# Patient Record
Sex: FEMALE | Race: WHITE | Employment: UNEMPLOYED | URBAN - METROPOLITAN AREA
[De-identification: names, ages, dates, MRNs, and addresses within clinical notes are randomized per-mention and may not be internally consistent; named-entity substitution may affect disease eponyms.]

---

## 2017-04-05 ENCOUNTER — TELEPHONE (OUTPATIENT)
Dept: PULMONOLOGY | Age: 67
End: 2017-04-05

## 2017-04-28 ENCOUNTER — HOSPITAL ENCOUNTER (OUTPATIENT)
Dept: PULMONOLOGY | Age: 67
Discharge: HOME | End: 2017-04-28
Attending: INTERNAL MEDICINE | Admitting: INTERNAL MEDICINE

## 2017-04-28 VITALS — RESPIRATION RATE: 14 BRPM | OXYGEN SATURATION: 97 %

## 2017-04-28 DIAGNOSIS — J45.909 UNCOMPLICATED ASTHMA: ICD-10-CM

## 2017-04-28 RX ORDER — ALBUTEROL SULFATE 2.5 MG/3ML
2.5 SOLUTION RESPIRATORY (INHALATION) ONCE
Status: COMPLETED | OUTPATIENT
Start: 2017-04-28 | End: 2017-04-28

## 2017-04-28 RX ADMIN — ALBUTEROL SULFATE 2.5 MG: 2.5 SOLUTION RESPIRATORY (INHALATION) at 14:00

## 2017-05-04 ENCOUNTER — OFFICE VISIT (OUTPATIENT)
Dept: PULMONOLOGY | Age: 67
End: 2017-05-04

## 2017-05-04 VITALS
BODY MASS INDEX: 27.13 KG/M2 | HEIGHT: 66 IN | HEART RATE: 65 BPM | DIASTOLIC BLOOD PRESSURE: 70 MMHG | WEIGHT: 168.8 LBS | SYSTOLIC BLOOD PRESSURE: 114 MMHG | TEMPERATURE: 98 F | RESPIRATION RATE: 16 BRPM | OXYGEN SATURATION: 98 %

## 2017-05-04 DIAGNOSIS — J45.20 MILD INTERMITTENT ASTHMA WITHOUT COMPLICATION: Primary | ICD-10-CM

## 2017-05-04 DIAGNOSIS — J30.9 ALLERGIC RHINITIS, UNSPECIFIED ALLERGIC RHINITIS TRIGGER, UNSPECIFIED RHINITIS SEASONALITY: ICD-10-CM

## 2017-05-04 PROCEDURE — G8427 DOCREV CUR MEDS BY ELIG CLIN: HCPCS | Performed by: INTERNAL MEDICINE

## 2017-05-04 PROCEDURE — 1123F ACP DISCUSS/DSCN MKR DOCD: CPT | Performed by: INTERNAL MEDICINE

## 2017-05-04 PROCEDURE — G8420 CALC BMI NORM PARAMETERS: HCPCS | Performed by: INTERNAL MEDICINE

## 2017-05-04 PROCEDURE — 3014F SCREEN MAMMO DOC REV: CPT | Performed by: INTERNAL MEDICINE

## 2017-05-04 PROCEDURE — 1036F TOBACCO NON-USER: CPT | Performed by: INTERNAL MEDICINE

## 2017-05-04 PROCEDURE — 4040F PNEUMOC VAC/ADMIN/RCVD: CPT | Performed by: INTERNAL MEDICINE

## 2017-05-04 PROCEDURE — 3017F COLORECTAL CA SCREEN DOC REV: CPT | Performed by: INTERNAL MEDICINE

## 2017-05-04 PROCEDURE — 99214 OFFICE O/P EST MOD 30 MIN: CPT | Performed by: INTERNAL MEDICINE

## 2017-05-04 PROCEDURE — 1090F PRES/ABSN URINE INCON ASSESS: CPT | Performed by: INTERNAL MEDICINE

## 2017-05-04 PROCEDURE — G8400 PT W/DXA NO RESULTS DOC: HCPCS | Performed by: INTERNAL MEDICINE

## 2017-05-04 RX ORDER — ALBUTEROL SULFATE 90 UG/1
2 AEROSOL, METERED RESPIRATORY (INHALATION) EVERY 6 HOURS PRN
Qty: 1 INHALER | Refills: 11 | Status: SHIPPED | OUTPATIENT
Start: 2017-05-04 | End: 2018-05-07 | Stop reason: SDUPTHER

## 2017-05-04 RX ORDER — IPRATROPIUM BROMIDE 21 UG/1
2 SPRAY, METERED NASAL 3 TIMES DAILY PRN
Qty: 1 BOTTLE | Refills: 11 | Status: SHIPPED | OUTPATIENT
Start: 2017-05-04 | End: 2018-05-07 | Stop reason: SDUPTHER

## 2017-09-18 ENCOUNTER — HOSPITAL ENCOUNTER (OUTPATIENT)
Dept: OTHER | Age: 67
Discharge: HOME | End: 2017-09-18
Attending: INTERNAL MEDICINE | Admitting: INTERNAL MEDICINE

## 2017-09-18 ENCOUNTER — OFFICE VISIT (OUTPATIENT)
Dept: PULMONOLOGY | Age: 67
End: 2017-09-18

## 2017-09-18 ENCOUNTER — TELEPHONE (OUTPATIENT)
Dept: PULMONOLOGY | Age: 67
End: 2017-09-18

## 2017-09-18 VITALS
DIASTOLIC BLOOD PRESSURE: 70 MMHG | RESPIRATION RATE: 18 BRPM | OXYGEN SATURATION: 97 % | TEMPERATURE: 97.6 F | SYSTOLIC BLOOD PRESSURE: 120 MMHG | HEART RATE: 66 BPM | HEIGHT: 66 IN | WEIGHT: 174.2 LBS | BODY MASS INDEX: 28 KG/M2

## 2017-09-18 DIAGNOSIS — J40 TRACHEOBRONCHITIS: ICD-10-CM

## 2017-09-18 DIAGNOSIS — J45.901 ASTHMA WITH ACUTE EXACERBATION, UNSPECIFIED ASTHMA SEVERITY: ICD-10-CM

## 2017-09-18 DIAGNOSIS — J45.901 ASTHMA WITH ACUTE EXACERBATION, UNSPECIFIED ASTHMA SEVERITY: Primary | ICD-10-CM

## 2017-09-18 PROCEDURE — 1123F ACP DISCUSS/DSCN MKR DOCD: CPT | Performed by: INTERNAL MEDICINE

## 2017-09-18 PROCEDURE — 99214 OFFICE O/P EST MOD 30 MIN: CPT | Performed by: INTERNAL MEDICINE

## 2017-09-18 PROCEDURE — 1036F TOBACCO NON-USER: CPT | Performed by: INTERNAL MEDICINE

## 2017-09-18 PROCEDURE — 1090F PRES/ABSN URINE INCON ASSESS: CPT | Performed by: INTERNAL MEDICINE

## 2017-09-18 PROCEDURE — G8400 PT W/DXA NO RESULTS DOC: HCPCS | Performed by: INTERNAL MEDICINE

## 2017-09-18 PROCEDURE — G8427 DOCREV CUR MEDS BY ELIG CLIN: HCPCS | Performed by: INTERNAL MEDICINE

## 2017-09-18 PROCEDURE — 4040F PNEUMOC VAC/ADMIN/RCVD: CPT | Performed by: INTERNAL MEDICINE

## 2017-09-18 PROCEDURE — 3017F COLORECTAL CA SCREEN DOC REV: CPT | Performed by: INTERNAL MEDICINE

## 2017-09-18 PROCEDURE — 3014F SCREEN MAMMO DOC REV: CPT | Performed by: INTERNAL MEDICINE

## 2017-09-18 PROCEDURE — G8417 CALC BMI ABV UP PARAM F/U: HCPCS | Performed by: INTERNAL MEDICINE

## 2017-09-18 RX ORDER — PREDNISONE 10 MG/1
TABLET ORAL
Qty: 30 TABLET | Refills: 0 | Status: SHIPPED | OUTPATIENT
Start: 2017-09-18 | End: 2017-09-28

## 2017-09-18 RX ORDER — DOXYCYCLINE HYCLATE 100 MG/1
100 CAPSULE ORAL 2 TIMES DAILY
Qty: 10 CAPSULE | Refills: 0 | Status: SHIPPED | OUTPATIENT
Start: 2017-09-18 | End: 2017-09-23

## 2017-09-18 RX ORDER — LEVOTHYROXINE SODIUM 0.07 MG/1
25 TABLET ORAL DAILY
COMMUNITY
Start: 2017-07-22

## 2017-09-18 RX ORDER — AZITHROMYCIN 500 MG/1
500 TABLET, FILM COATED ORAL DAILY
Qty: 5 TABLET | Refills: 0 | Status: CANCELLED | OUTPATIENT
Start: 2017-09-18 | End: 2017-09-23

## 2018-05-07 ENCOUNTER — OFFICE VISIT (OUTPATIENT)
Dept: PULMONOLOGY | Age: 68
End: 2018-05-07

## 2018-05-07 VITALS
HEIGHT: 66 IN | TEMPERATURE: 98.2 F | OXYGEN SATURATION: 97 % | RESPIRATION RATE: 18 BRPM | WEIGHT: 176 LBS | SYSTOLIC BLOOD PRESSURE: 120 MMHG | DIASTOLIC BLOOD PRESSURE: 60 MMHG | BODY MASS INDEX: 28.28 KG/M2 | HEART RATE: 64 BPM

## 2018-05-07 DIAGNOSIS — J45.20 MILD INTERMITTENT ASTHMA, UNSPECIFIED WHETHER COMPLICATED: Primary | ICD-10-CM

## 2018-05-07 DIAGNOSIS — J30.9 ALLERGIC RHINITIS, UNSPECIFIED CHRONICITY, UNSPECIFIED SEASONALITY, UNSPECIFIED TRIGGER: ICD-10-CM

## 2018-05-07 PROCEDURE — 1090F PRES/ABSN URINE INCON ASSESS: CPT | Performed by: INTERNAL MEDICINE

## 2018-05-07 PROCEDURE — G8400 PT W/DXA NO RESULTS DOC: HCPCS | Performed by: INTERNAL MEDICINE

## 2018-05-07 PROCEDURE — 3017F COLORECTAL CA SCREEN DOC REV: CPT | Performed by: INTERNAL MEDICINE

## 2018-05-07 PROCEDURE — 1123F ACP DISCUSS/DSCN MKR DOCD: CPT | Performed by: INTERNAL MEDICINE

## 2018-05-07 PROCEDURE — 99214 OFFICE O/P EST MOD 30 MIN: CPT | Performed by: INTERNAL MEDICINE

## 2018-05-07 PROCEDURE — G8417 CALC BMI ABV UP PARAM F/U: HCPCS | Performed by: INTERNAL MEDICINE

## 2018-05-07 PROCEDURE — G8427 DOCREV CUR MEDS BY ELIG CLIN: HCPCS | Performed by: INTERNAL MEDICINE

## 2018-05-07 PROCEDURE — 1036F TOBACCO NON-USER: CPT | Performed by: INTERNAL MEDICINE

## 2018-05-07 PROCEDURE — 4040F PNEUMOC VAC/ADMIN/RCVD: CPT | Performed by: INTERNAL MEDICINE

## 2018-05-07 RX ORDER — IPRATROPIUM BROMIDE 21 UG/1
2 SPRAY, METERED NASAL 3 TIMES DAILY PRN
Qty: 3 BOTTLE | Refills: 3 | Status: SHIPPED | OUTPATIENT
Start: 2018-05-07 | End: 2019-05-08 | Stop reason: SDUPTHER

## 2018-05-07 RX ORDER — ALBUTEROL SULFATE 90 UG/1
2 AEROSOL, METERED RESPIRATORY (INHALATION) EVERY 6 HOURS PRN
Qty: 3 INHALER | Refills: 3 | Status: SHIPPED | OUTPATIENT
Start: 2018-05-07 | End: 2019-05-08 | Stop reason: SDUPTHER

## 2018-12-27 ENCOUNTER — HOSPITAL ENCOUNTER (OUTPATIENT)
Age: 68
Discharge: HOME | End: 2018-12-27
Payer: MEDICARE

## 2018-12-27 PROCEDURE — 83013 H PYLORI (C-13) BREATH: CPT

## 2018-12-29 LAB — H PYLORI BREATH TEST: NEGATIVE

## 2019-01-03 ENCOUNTER — OFFICE VISIT (OUTPATIENT)
Dept: ORTHOPEDIC SURGERY | Age: 69
End: 2019-01-03
Payer: MEDICARE

## 2019-01-03 VITALS — RESPIRATION RATE: 16 BRPM | WEIGHT: 175.93 LBS | BODY MASS INDEX: 28.27 KG/M2 | HEIGHT: 66 IN

## 2019-01-03 DIAGNOSIS — M65.4 DE QUERVAIN'S DISEASE (RADIAL STYLOID TENOSYNOVITIS): ICD-10-CM

## 2019-01-03 PROCEDURE — 4040F PNEUMOC VAC/ADMIN/RCVD: CPT | Performed by: ORTHOPAEDIC SURGERY

## 2019-01-03 PROCEDURE — 99203 OFFICE O/P NEW LOW 30 MIN: CPT | Performed by: ORTHOPAEDIC SURGERY

## 2019-01-03 PROCEDURE — 20550 NJX 1 TENDON SHEATH/LIGAMENT: CPT | Performed by: ORTHOPAEDIC SURGERY

## 2019-01-03 PROCEDURE — G8417 CALC BMI ABV UP PARAM F/U: HCPCS | Performed by: ORTHOPAEDIC SURGERY

## 2019-01-03 PROCEDURE — L3908 WHO COCK-UP NONMOLDE PRE OTS: HCPCS | Performed by: ORTHOPAEDIC SURGERY

## 2019-01-03 PROCEDURE — G8427 DOCREV CUR MEDS BY ELIG CLIN: HCPCS | Performed by: ORTHOPAEDIC SURGERY

## 2019-01-03 PROCEDURE — 1101F PT FALLS ASSESS-DOCD LE1/YR: CPT | Performed by: ORTHOPAEDIC SURGERY

## 2019-01-03 PROCEDURE — G8400 PT W/DXA NO RESULTS DOC: HCPCS | Performed by: ORTHOPAEDIC SURGERY

## 2019-01-03 PROCEDURE — 3017F COLORECTAL CA SCREEN DOC REV: CPT | Performed by: ORTHOPAEDIC SURGERY

## 2019-01-03 PROCEDURE — 1123F ACP DISCUSS/DSCN MKR DOCD: CPT | Performed by: ORTHOPAEDIC SURGERY

## 2019-01-03 PROCEDURE — G8484 FLU IMMUNIZE NO ADMIN: HCPCS | Performed by: ORTHOPAEDIC SURGERY

## 2019-01-03 PROCEDURE — 1036F TOBACCO NON-USER: CPT | Performed by: ORTHOPAEDIC SURGERY

## 2019-01-03 PROCEDURE — 1090F PRES/ABSN URINE INCON ASSESS: CPT | Performed by: ORTHOPAEDIC SURGERY

## 2019-01-03 RX ORDER — TRAMADOL HYDROCHLORIDE 50 MG/1
TABLET ORAL
Refills: 0 | COMMUNITY
Start: 2018-12-19 | End: 2019-05-08

## 2019-05-07 ENCOUNTER — TELEPHONE (OUTPATIENT)
Dept: PULMONOLOGY | Age: 69
End: 2019-05-07

## 2019-05-07 DIAGNOSIS — J45.20 MILD INTERMITTENT ASTHMA, UNSPECIFIED WHETHER COMPLICATED: Primary | ICD-10-CM

## 2019-05-08 ENCOUNTER — HOSPITAL ENCOUNTER (OUTPATIENT)
Dept: PULMONOLOGY | Age: 69
Discharge: HOME | End: 2019-05-08
Payer: MEDICARE

## 2019-05-08 ENCOUNTER — OFFICE VISIT (OUTPATIENT)
Dept: PULMONOLOGY | Age: 69
End: 2019-05-08
Payer: MEDICARE

## 2019-05-08 ENCOUNTER — HOSPITAL ENCOUNTER (OUTPATIENT)
Dept: GENERAL RADIOLOGY | Age: 69
Discharge: HOME | End: 2019-05-08
Payer: MEDICARE

## 2019-05-08 ENCOUNTER — HOSPITAL ENCOUNTER (OUTPATIENT)
Age: 69
Discharge: HOME | End: 2019-05-08
Payer: MEDICARE

## 2019-05-08 VITALS
SYSTOLIC BLOOD PRESSURE: 122 MMHG | DIASTOLIC BLOOD PRESSURE: 68 MMHG | RESPIRATION RATE: 20 BRPM | OXYGEN SATURATION: 97 % | HEART RATE: 78 BPM | BODY MASS INDEX: 31.49 KG/M2 | TEMPERATURE: 97.6 F | WEIGHT: 166.8 LBS | HEIGHT: 61 IN

## 2019-05-08 VITALS — OXYGEN SATURATION: 99 %

## 2019-05-08 DIAGNOSIS — J45.20 MILD INTERMITTENT ASTHMA, UNSPECIFIED WHETHER COMPLICATED: ICD-10-CM

## 2019-05-08 DIAGNOSIS — J45.20 MILD INTERMITTENT ASTHMA, UNCOMPLICATED: ICD-10-CM

## 2019-05-08 DIAGNOSIS — J30.9 ALLERGIC RHINITIS, UNSPECIFIED SEASONALITY, UNSPECIFIED TRIGGER: ICD-10-CM

## 2019-05-08 DIAGNOSIS — J45.20 MILD INTERMITTENT ASTHMA, UNSPECIFIED WHETHER COMPLICATED: Primary | ICD-10-CM

## 2019-05-08 DIAGNOSIS — R93.89 ABNORMAL CT OF THE CHEST: ICD-10-CM

## 2019-05-08 PROCEDURE — 4040F PNEUMOC VAC/ADMIN/RCVD: CPT | Performed by: INTERNAL MEDICINE

## 2019-05-08 PROCEDURE — 1123F ACP DISCUSS/DSCN MKR DOCD: CPT | Performed by: INTERNAL MEDICINE

## 2019-05-08 PROCEDURE — G8427 DOCREV CUR MEDS BY ELIG CLIN: HCPCS | Performed by: INTERNAL MEDICINE

## 2019-05-08 PROCEDURE — 94729 DIFFUSING CAPACITY: CPT

## 2019-05-08 PROCEDURE — 94060 EVALUATION OF WHEEZING: CPT

## 2019-05-08 PROCEDURE — 94726 PLETHYSMOGRAPHY LUNG VOLUMES: CPT

## 2019-05-08 PROCEDURE — 99214 OFFICE O/P EST MOD 30 MIN: CPT | Performed by: INTERNAL MEDICINE

## 2019-05-08 PROCEDURE — 94640 AIRWAY INHALATION TREATMENT: CPT

## 2019-05-08 PROCEDURE — 1036F TOBACCO NON-USER: CPT | Performed by: INTERNAL MEDICINE

## 2019-05-08 PROCEDURE — 1090F PRES/ABSN URINE INCON ASSESS: CPT | Performed by: INTERNAL MEDICINE

## 2019-05-08 PROCEDURE — 6360000002 HC RX W HCPCS: Performed by: INTERNAL MEDICINE

## 2019-05-08 PROCEDURE — 3017F COLORECTAL CA SCREEN DOC REV: CPT | Performed by: INTERNAL MEDICINE

## 2019-05-08 PROCEDURE — G8400 PT W/DXA NO RESULTS DOC: HCPCS | Performed by: INTERNAL MEDICINE

## 2019-05-08 PROCEDURE — 71046 X-RAY EXAM CHEST 2 VIEWS: CPT

## 2019-05-08 PROCEDURE — G8417 CALC BMI ABV UP PARAM F/U: HCPCS | Performed by: INTERNAL MEDICINE

## 2019-05-08 PROCEDURE — 94760 N-INVAS EAR/PLS OXIMETRY 1: CPT

## 2019-05-08 RX ORDER — IPRATROPIUM BROMIDE 21 UG/1
2 SPRAY, METERED NASAL 3 TIMES DAILY PRN
Qty: 1 BOTTLE | Refills: 11 | Status: SHIPPED | OUTPATIENT
Start: 2019-05-08 | End: 2020-05-06 | Stop reason: SDUPTHER

## 2019-05-08 RX ORDER — ALBUTEROL SULFATE 2.5 MG/3ML
2.5 SOLUTION RESPIRATORY (INHALATION) ONCE
Status: COMPLETED | OUTPATIENT
Start: 2019-05-08 | End: 2019-05-08

## 2019-05-08 RX ORDER — ALBUTEROL SULFATE 90 UG/1
2 AEROSOL, METERED RESPIRATORY (INHALATION) EVERY 6 HOURS PRN
Qty: 1 INHALER | Refills: 11 | Status: SHIPPED | OUTPATIENT
Start: 2019-05-08 | End: 2020-05-06 | Stop reason: SDUPTHER

## 2019-05-08 RX ADMIN — ALBUTEROL SULFATE 2.5 MG: 2.5 SOLUTION RESPIRATORY (INHALATION) at 08:52

## 2019-05-08 NOTE — PROGRESS NOTES
[facility]    Outpatient Follow Up Note      CHIEF COMPLAINT: follow up asthma/PFTs      HPI:   PFTs were reviewed by me and noted. Results were dicussed with patient and multiple good questions were answered. Patient had recently URI/flu and completed course of abx. She is doing better. Not much sputum. No hemoptysis   No smoking   Albuterol < 2 times/month   Did not get her flu shot this year   Uses Atrovent PRN with help       Past Medical History:   Diagnosis Date    Anxiety     Asthma     Depression     Esophageal reflux     Hyperlipidemia     Hypothyroidism     Lung disease     Mitral valve prolapse     Pneumonia 2010    Pulmonary fibrosis (HCC)        Past Surgical History:        Procedure Laterality Date    BRONCHOSCOPY      TONSILLECTOMY      TUBAL LIGATION         Allergies:  is allergic to atropine; azithromycin; demerol; ipratropium; lipitor [atorvastatin]; montelukast sodium; morphine; phenergan [promethazine hcl]; statins; zithromax [azithromycin dihydrate]; amoxicillin; aspirin; and symbicort [budesonide-formoterol fumarate]. Social History:    TOBACCO:   reports that she quit smoking about 11 years ago. Her smoking use included cigarettes. She has a 10.00 pack-year smoking history. She has never used smokeless tobacco.  ETOH:   reports that she does not drink alcohol.       Family History:       Problem Relation Age of Onset    High Cholesterol Mother     Heart Disease Mother     Cancer Mother     Diabetes Father     Heart Disease Sister     Asthma Neg Hx     Emphysema Neg Hx     Heart Failure Neg Hx     Hypertension Neg Hx        Current Medications:    Current Outpatient Medications:     ipratropium (ATROVENT) 0.03 % nasal spray, 2 sprays by Nasal route 3 times daily as needed for Rhinitis, Disp: 3 Bottle, Rfl: 3    albuterol sulfate HFA (VENTOLIN HFA) 108 (90 Base) MCG/ACT inhaler, Inhale 2 puffs into the lungs every 6 hours as needed for Wheezing or Shortness of Breath, Disp: 3 Inhaler, Rfl: 3    levothyroxine (SYNTHROID) 75 MCG tablet, , Disp: , Rfl:        Objective:     /68 (Site: Left Upper Arm, Position: Sitting, Cuff Size: Medium Adult)   Pulse 78   Temp 97.6 °F (36.4 °C) (Oral)   Resp 20   Ht 5' 0.5\" (1.537 m)   Wt 166 lb 12.8 oz (75.7 kg)   SpO2 97% Comment: RA  BMI 32.04 kg/m²  RA  Gen: No distress. Eyes: PERRL. No sclera icterus. No conjunctival injection. ENT: No discharge. Pharynx clear. Neck: Trachea midline. No obvious mass. Resp: No accessory muscle use. No crackles. no wheezes. No rhonchi. No dullness on percussion. Decreased air entry. CV: Regular rate. Regular rhythm. No murmur or rub. No edema. GI: Non-tender. Non-distended. No hernia. Skin: Warm and dry. No nodule on exposed extremities. Lymph: No cervical LAD. No supraclavicular LAD. M/S: No cyanosis. No joint deformity. No clubbing. Neuro: Awake. Alert. Moves all four extremities. Psych: Oriented x 3. No anxiety.      DATA reviewed by me:   PFTs 05/08/2019 FEV1 2.34L(96%) FEV1/FVC 76% TLC 4.99(93 %)     DLCO 18.22(78%)   PFTs 04/28/2017 FEV1 2.30L(92%) FEV1/FVC 71% TLC 5.45(101%)    DLCO 20.21(85%)   PFTs 04/10/2014 FEV1 2.70L(105%)                          TLC 5.45L (           DLCO 18.92 (79%)   PFTs 02/26/2013 FEV1 2.69L(103%)                          TLC 5.41L (99%)  DLCO 18.36 (76%)  PFTs 10/12/2011 FEV1 2.61L(111%)                          TLC 5.99L(114%)  DLCO 18.71 (98%)  PFTs 12/01/2009 FEV1 2.75L                                      TLC 5.60L             DLCO 17.73   PFTs 10/17/2008 FEV1 2.55L                                      TLC 5.95L             DLCO 20.12  PFTs 01/15/2008 FEV1 2.56L                                      TLC 5.21L             DLCO 20.32      CXR 9/18/2017  No acute cardiopulmonary disease       Assessment:   · Mild intermittent Asthma   · Allergic rhinitis  · Abnormal CT chest -Bilateral apical scarring stable since 3/2008. Most recent CT 9/2010. No further followup is felt to be warranted at this time. 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 Repeated AFB were negative. No indication to treat. · 15 pack-years- quit 2008         Plan:     · CXR PA and lateral   · Albuterol 2 puffs Q4-6 hrs PRN  · Ipratropium nasal spray (0.03%) 2 sprays in each nostril 2-3 times/day PRN  · Patient is up to date with Pneumococcal vaccine.   · Advised to get influenza vaccine this year    · Asthma education   · Peak flow meter monitoring

## 2019-05-09 NOTE — PROCEDURES
[address]                 [address] [address]                               PULMONARY FUNCTION    PATIENT NAME: Kya Vickers                    :        1950  MED REC NO:   [id number]                          ROOM:  ACCOUNT NO:   [de-identified]                           ADMIT DATE: 2019  PROVIDER:     Kiana Santiago MD    DATE OF PROCEDURE:  2019    INDICATION:  Asthma. FINDINGS:  1. SPIROMETRY:  The FEV1 is 2.34 liters, 96% of predicted. The  FEV1/FVC ratio is normal.  Inhaled bronchodilators were given. There is  no significant improvement. 2.  LUNG VOLUMES:  Total lung capacity is normal at 4.99 liters, which  is 93% of predicted. 3.  DIFFUSION CAPACITY:  DLCO is 18.22 mL/min/mmHg, which is 78% of  predicted. 4.  Flow volume loop is normal.    IMPRESSION:  1. No obstructive or restrictive lung defect. 2.  Mild reduction in diffusion capacity.         Georgi Bateman MD    D: 2019 12:03:20       T: 2019 22:29:42     [email or web address]  Job#: [id number]     Doc#: [id number]    CC:

## 2019-10-11 ENCOUNTER — TELEPHONE (OUTPATIENT)
Dept: PULMONOLOGY | Age: 69
End: 2019-10-11

## 2020-04-30 ENCOUNTER — TELEPHONE (OUTPATIENT)
Dept: PULMONOLOGY | Age: 70
End: 2020-04-30

## 2020-05-06 ENCOUNTER — VIRTUAL VISIT (OUTPATIENT)
Dept: PULMONOLOGY | Age: 70
End: 2020-05-06
Payer: MEDICARE

## 2020-05-06 VITALS
HEIGHT: 66 IN | HEART RATE: 75 BPM | TEMPERATURE: 97.5 F | SYSTOLIC BLOOD PRESSURE: 118 MMHG | BODY MASS INDEX: 25.71 KG/M2 | DIASTOLIC BLOOD PRESSURE: 71 MMHG | WEIGHT: 160 LBS

## 2020-05-06 PROCEDURE — 99213 OFFICE O/P EST LOW 20 MIN: CPT | Performed by: INTERNAL MEDICINE

## 2020-05-06 RX ORDER — M-VIT,TX,IRON,MINS/CALC/FOLIC 27MG-0.4MG
1 TABLET ORAL DAILY
COMMUNITY

## 2020-05-06 RX ORDER — ALBUTEROL SULFATE 90 UG/1
2 AEROSOL, METERED RESPIRATORY (INHALATION) EVERY 6 HOURS PRN
Qty: 1 INHALER | Refills: 11 | Status: SHIPPED | OUTPATIENT
Start: 2020-05-06 | End: 2021-07-28 | Stop reason: SDUPTHER

## 2020-05-06 RX ORDER — IPRATROPIUM BROMIDE 21 UG/1
2 SPRAY, METERED NASAL 3 TIMES DAILY PRN
Qty: 1 BOTTLE | Refills: 11 | Status: SHIPPED | OUTPATIENT
Start: 2020-05-06

## 2020-05-06 NOTE — PROGRESS NOTES
[facility]    Outpatient Follow Up Note  TELEHEALTH EVALUATION: Service performed was Audio/Visual (During SZCNL-53 public health emergency) and not a face-to-face visit       CHIEF COMPLAINT: follow up asthma       HPI:   Patient had Covid test and was negative   Doing okay  Allergy is bother her and using Albuterol more - twice a week   No cough or sputum   No wheezes   Uses Atrovent PRN - works when she uses it   No smoking       Past Medical History:   Diagnosis Date    Anxiety     Asthma     Depression     Esophageal reflux     Hyperlipidemia     Hypothyroidism     Lung disease     Mitral valve prolapse     Pneumonia 2010    Pulmonary fibrosis ([facility] [address]        Past Surgical History:        Procedure Laterality Date    BRONCHOSCOPY      TONSILLECTOMY      TUBAL LIGATION         Allergies:  is allergic to atropine; azithromycin; demerol; ipratropium; lipitor [atorvastatin]; montelukast sodium; morphine; phenergan [promethazine hcl]; statins; zithromax [azithromycin dihydrate]; amoxicillin; aspirin; and symbicort [budesonide-formoterol fumarate]. Social History:    TOBACCO:   reports that she quit smoking about 12 years ago. Her smoking use included cigarettes. She has a 10.00 pack-year smoking history. She has never used smokeless tobacco.  ETOH:   reports no history of alcohol use.       Family History:       Problem Relation Age of Onset    High Cholesterol Mother     Heart Disease Mother     Cancer Mother     Diabetes Father     Heart Disease Sister     Asthma Neg Hx     Emphysema Neg Hx     Heart Failure Neg Hx     Hypertension Neg Hx        Current Medications:    Current Outpatient Medications:     albuterol sulfate HFA (VENTOLIN HFA) 108 (90 Base) MCG/ACT inhaler, Inhale 2 puffs into the lungs every 6 hours as needed for Wheezing or Shortness of Breath, Disp: 1 Inhaler, Rfl: 11    ipratropium (ATROVENT) 0.03 % nasal spray, 2 sprays by Nasal route 3 times TLC 5.21L             DLCO 20.32      Chest x-ray 5/8/2019 imaging reviewed by me and showed no acute cardiopulmonary disease    Assessment:   · Mild intermittent Asthma   · Allergic rhinitis  · Abnormal CT chest -Bilateral apical scarring stable since 3/2008. Most recent CT 9/2010. No further followup is felt to be warranted at this time. 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 Repeated AFB were negative. No indication to treat. · 15 pack-years- quit 2008         Plan:     · Continue Albuterol 2 puffs Q4-6 hrs PRN- refills   · Continue Ipratropium nasal spray (0.03%) 2 sprays in each nostril 2-3 times/day PRN- refills   · Patient is up to date with Pneumococcal vaccine and influenza vaccine  · Asthma education   · Peak flow meter monitoring  · Follow up in 6 months             Mario Ann is a 65 y.o. female being evaluated by a Virtual Visit (video visit) encounter to address concerns as mentioned above. A caregiver was present when appropriate. Due to this being a TeleHealth encounter ([facility]), evaluation of the following organ systems was limited: Vitals/Constitutional/EENT/Resp/CV/GI//MS/Neuro/Skin/Heme-Lymph-Imm. Pursuant to the emergency declaration under the [address] [place] [address] authority and the [employer] Act, this Virtual Visit was conducted with patient's (and/or legal guardian's) consent, to reduce the patient's risk of exposure to COVID-19 and provide necessary medical care. The patient (and/or legal guardian) has also been advised to contact this office for worsening conditions or problems, and seek emergency medical treatment and/or call 911 if deemed necessary. Services were provided through a video synchronous discussion virtually to substitute for in-person clinic visit. Patient was located in her home, provider was located in his office.     --Ab Doss

## 2020-11-05 ENCOUNTER — VIRTUAL VISIT (OUTPATIENT)
Dept: PULMONOLOGY | Age: 70
End: 2020-11-05
Payer: MEDICARE

## 2020-11-05 PROCEDURE — 99442 PR PHYS/QHP TELEPHONE EVALUATION 11-20 MIN: CPT | Performed by: INTERNAL MEDICINE

## 2020-11-05 NOTE — PROGRESS NOTES
Hello no vasodilator 1 [facility]    Outpatient Follow Up Note  TELEHEALTH EVALUATION: Service performed was Audio (During USB-60 public health emergency) and not a face-to-face visit     CHIEF COMPLAINT: follow up asthma       HPI:   Doing good  No cough or sputum   Not needing to use the rescue inhaler- Albuterol   No smoking         Past Medical History:   Diagnosis Date    Anxiety     Asthma     Depression     Esophageal reflux     Hyperlipidemia     Hypothyroidism     Lung disease     Mitral valve prolapse     Pneumonia 2010    Pulmonary fibrosis ([facility] [address]        Past Surgical History:        Procedure Laterality Date    BRONCHOSCOPY      TONSILLECTOMY      TUBAL LIGATION         Allergies:  is allergic to atropine; azithromycin; demerol; ipratropium; lipitor [atorvastatin]; montelukast sodium; morphine; phenergan [promethazine hcl]; statins; zithromax [azithromycin dihydrate]; amoxicillin; aspirin; and symbicort [budesonide-formoterol fumarate]. Social History:    TOBACCO:   reports that she quit smoking about 12 years ago. Her smoking use included cigarettes. She has a 10.00 pack-year smoking history. She has never used smokeless tobacco.  ETOH:   reports no history of alcohol use.       Family History:       Problem Relation Age of Onset    High Cholesterol Mother     Heart Disease Mother     Cancer Mother     Diabetes Father     Heart Disease Sister     Asthma Neg Hx     Emphysema Neg Hx     Heart Failure Neg Hx     Hypertension Neg Hx        Current Medications:    Current Outpatient Medications:     Multiple Vitamins-Minerals (THERAPEUTIC MULTIVITAMIN-MINERALS) tablet, Take 1 tablet by mouth daily, Disp: , Rfl:     Probiotic Product (PROBIOTIC DAILY PO), Take by mouth daily, Disp: , Rfl:     albuterol sulfate HFA (VENTOLIN HFA) 108 (90 Base) MCG/ACT inhaler, Inhale 2 puffs into the lungs every 6 hours as needed for Wheezing or Shortness of Breath, Disp: 1 Inhaler, Rfl: 11    ipratropium (ATROVENT) 0.03 % nasal spray, 2 sprays by Nasal route 3 times daily as needed for Rhinitis, Disp: 1 Bottle, Rfl: 11    levothyroxine (SYNTHROID) 75 MCG tablet, Take 25 mcg by mouth Daily , Disp: , Rfl:        Objective:   Telephone visit not able to obtain physical exam           DATA reviewed by me:   PFTs 05/08/2019 FEV1 2.34L(96%) FEV1/FVC 76% TLC 4.99(93 %)     DLCO 18.22(78%)   PFTs 04/28/2017 FEV1 2.30L(92%) FEV1/FVC 71% TLC 5.45(101%)    DLCO 20.21(85%)   PFTs 04/10/2014 FEV1 2.70L(105%)                          TLC 5.45L (           DLCO 18.92 (79%)   PFTs 02/26/2013 FEV1 2.69L(103%)                          TLC 5.41L (99%)  DLCO 18.36 (76%)  PFTs 10/12/2011 FEV1 2.61L(111%)                          TLC 5.99L(114%)  DLCO 18.71 (98%)  PFTs 12/01/2009 FEV1 2.75L                                      TLC 5.60L             DLCO 17.73   PFTs 10/17/2008 FEV1 2.55L                                      TLC 5.95L             DLCO 20.12  PFTs 01/15/2008 FEV1 2.56L                                      TLC 5.21L             DLCO 20.32      Chest x-ray 5/8/2019 imaging reviewed by me and showed no acute cardiopulmonary disease    Assessment:   · Mild intermittent Asthma   · Allergic rhinitis  · Abnormal CT chest -Bilateral apical scarring stable since 3/2008. Most recent CT 9/2010. No further followup is felt to be warranted at this time. 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 Repeated AFB were negative. No indication to treat. · 15 pack-years- quit 2008         Plan:     · Continue Albuterol 2 puffs Q4-6 hrs PRN- refills   · Continue Atrovent nasal spray PRN   · Patient is up to date with Pneumococcal vaccine   · Advised to get influenza vaccine this year   · Peak flow meter monitoring  · Follow up in 6 months           Kelsey Nugent is a 65 y.o. female evaluated via telephone on 11/5/2020.       Consent:  She and/or health care decision maker is aware that that she may receive a bill for this telephone service, depending on her insurance coverage, and has provided verbal consent to proceed: Yes       Documentation:  I communicated with the patient and/or health care decision maker about: See above   Details of this discussion including any medical advice provided: See above       I Affirm this is a Patient Initiated Episode with an Established Patient who has not had a related appointment within my department in the past 7 days or scheduled within the next 24 hours.     Total Time: 11-20 minutes     Note: not billable if this call serves to triage the patient into an appointment for the relevant concern      Maritza Frame

## 2021-05-24 ENCOUNTER — TELEPHONE (OUTPATIENT)
Dept: PULMONOLOGY | Age: 71
End: 2021-05-24

## 2021-05-24 NOTE — TELEPHONE ENCOUNTER
Patient cancelled appointment on 5/27/21 with Dr. Kalpana John for 6 month fu.    Reason: pt is out of town in [facility] and won't be back until 3rd week in June. There is no availability that week in provider's schedule. Patient did not reschedule appointment. Last OV 11/5/20    Assessment:   · Mild intermittent Asthma   · Allergic rhinitis  · Abnormal CT chest -Bilateral apical scarring stable since 3/2008. Most recent CT 9/2010. No further followup is felt to be warranted at this time. 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 Repeated AFB were negative. No indication to treat.    · 15 pack-years- quit 2008                 Plan:      · Continue Albuterol 2 puffs Q4-6 hrs PRN- refills   · Continue Atrovent nasal spray PRN   · Patient is up to date with Pneumococcal vaccine   · Advised to get influenza vaccine this year   · Peak flow meter monitoring  · Follow up in 6 months

## 2021-07-28 ENCOUNTER — TELEPHONE (OUTPATIENT)
Dept: PULMONOLOGY | Age: 71
End: 2021-07-28

## 2021-07-28 DIAGNOSIS — J45.20 MILD INTERMITTENT ASTHMA, UNSPECIFIED WHETHER COMPLICATED: Primary | ICD-10-CM

## 2021-07-29 RX ORDER — IPRATROPIUM BROMIDE 21 UG/1
2 SPRAY, METERED NASAL 3 TIMES DAILY PRN
Qty: 1 BOTTLE | Refills: 3 | Status: CANCELLED | OUTPATIENT
Start: 2021-07-29

## 2021-07-29 RX ORDER — ALBUTEROL SULFATE 90 UG/1
2 AEROSOL, METERED RESPIRATORY (INHALATION) EVERY 6 HOURS PRN
Qty: 1 INHALER | Refills: 3 | Status: SHIPPED | OUTPATIENT
Start: 2021-07-29